# Patient Record
Sex: FEMALE | Race: WHITE | NOT HISPANIC OR LATINO | Employment: FULL TIME | URBAN - METROPOLITAN AREA
[De-identification: names, ages, dates, MRNs, and addresses within clinical notes are randomized per-mention and may not be internally consistent; named-entity substitution may affect disease eponyms.]

---

## 2023-01-05 ENCOUNTER — LAB VISIT (OUTPATIENT)
Dept: LAB | Facility: HOSPITAL | Age: 38
End: 2023-01-05
Attending: ALLERGY & IMMUNOLOGY
Payer: COMMERCIAL

## 2023-01-05 ENCOUNTER — OFFICE VISIT (OUTPATIENT)
Dept: ALLERGY | Facility: CLINIC | Age: 38
End: 2023-01-05
Payer: COMMERCIAL

## 2023-01-05 VITALS
BODY MASS INDEX: 40.48 KG/M2 | HEIGHT: 67 IN | HEART RATE: 85 BPM | TEMPERATURE: 98 F | WEIGHT: 257.94 LBS | DIASTOLIC BLOOD PRESSURE: 80 MMHG | SYSTOLIC BLOOD PRESSURE: 137 MMHG

## 2023-01-05 DIAGNOSIS — J31.0 RHINITIS, UNSPECIFIED TYPE: ICD-10-CM

## 2023-01-05 DIAGNOSIS — B99.9 RECURRENT INFECTIONS: ICD-10-CM

## 2023-01-05 DIAGNOSIS — B99.9 RECURRENT INFECTIONS: Primary | ICD-10-CM

## 2023-01-05 LAB — IGE SERPL-ACNC: <35 IU/ML (ref 0–100)

## 2023-01-05 PROCEDURE — 3079F DIAST BP 80-89 MM HG: CPT | Mod: CPTII,S$GLB,, | Performed by: ALLERGY & IMMUNOLOGY

## 2023-01-05 PROCEDURE — 3075F PR MOST RECENT SYSTOLIC BLOOD PRESS GE 130-139MM HG: ICD-10-PCS | Mod: CPTII,S$GLB,, | Performed by: ALLERGY & IMMUNOLOGY

## 2023-01-05 PROCEDURE — 99999 PR PBB SHADOW E&M-NEW PATIENT-LVL IV: CPT | Mod: PBBFAC,,, | Performed by: ALLERGY & IMMUNOLOGY

## 2023-01-05 PROCEDURE — 86359 T CELLS TOTAL COUNT: CPT | Performed by: ALLERGY & IMMUNOLOGY

## 2023-01-05 PROCEDURE — 86317 IMMUNOASSAY INFECTIOUS AGENT: CPT | Mod: 59 | Performed by: ALLERGY & IMMUNOLOGY

## 2023-01-05 PROCEDURE — 3075F SYST BP GE 130 - 139MM HG: CPT | Mod: CPTII,S$GLB,, | Performed by: ALLERGY & IMMUNOLOGY

## 2023-01-05 PROCEDURE — 99204 PR OFFICE/OUTPT VISIT, NEW, LEVL IV, 45-59 MIN: ICD-10-PCS | Mod: S$GLB,,, | Performed by: ALLERGY & IMMUNOLOGY

## 2023-01-05 PROCEDURE — 86357 NK CELLS TOTAL COUNT: CPT | Performed by: ALLERGY & IMMUNOLOGY

## 2023-01-05 PROCEDURE — 86003 ALLG SPEC IGE CRUDE XTRC EA: CPT | Mod: 59 | Performed by: ALLERGY & IMMUNOLOGY

## 2023-01-05 PROCEDURE — 86684 HEMOPHILUS INFLUENZA ANTIBDY: CPT | Performed by: ALLERGY & IMMUNOLOGY

## 2023-01-05 PROCEDURE — 85025 COMPLETE CBC W/AUTO DIFF WBC: CPT | Performed by: ALLERGY & IMMUNOLOGY

## 2023-01-05 PROCEDURE — 82787 IGG 1 2 3 OR 4 EACH: CPT | Mod: 59 | Performed by: ALLERGY & IMMUNOLOGY

## 2023-01-05 PROCEDURE — 82784 ASSAY IGA/IGD/IGG/IGM EACH: CPT | Mod: 59 | Performed by: ALLERGY & IMMUNOLOGY

## 2023-01-05 PROCEDURE — 3008F PR BODY MASS INDEX (BMI) DOCUMENTED: ICD-10-PCS | Mod: CPTII,S$GLB,, | Performed by: ALLERGY & IMMUNOLOGY

## 2023-01-05 PROCEDURE — 1159F MED LIST DOCD IN RCRD: CPT | Mod: CPTII,S$GLB,, | Performed by: ALLERGY & IMMUNOLOGY

## 2023-01-05 PROCEDURE — 36415 COLL VENOUS BLD VENIPUNCTURE: CPT | Performed by: ALLERGY & IMMUNOLOGY

## 2023-01-05 PROCEDURE — 3079F PR MOST RECENT DIASTOLIC BLOOD PRESSURE 80-89 MM HG: ICD-10-PCS | Mod: CPTII,S$GLB,, | Performed by: ALLERGY & IMMUNOLOGY

## 2023-01-05 PROCEDURE — 82785 ASSAY OF IGE: CPT | Performed by: ALLERGY & IMMUNOLOGY

## 2023-01-05 PROCEDURE — 3008F BODY MASS INDEX DOCD: CPT | Mod: CPTII,S$GLB,, | Performed by: ALLERGY & IMMUNOLOGY

## 2023-01-05 PROCEDURE — 86355 B CELLS TOTAL COUNT: CPT | Performed by: ALLERGY & IMMUNOLOGY

## 2023-01-05 PROCEDURE — 82784 ASSAY IGA/IGD/IGG/IGM EACH: CPT | Performed by: ALLERGY & IMMUNOLOGY

## 2023-01-05 PROCEDURE — 1159F PR MEDICATION LIST DOCUMENTED IN MEDICAL RECORD: ICD-10-PCS | Mod: CPTII,S$GLB,, | Performed by: ALLERGY & IMMUNOLOGY

## 2023-01-05 PROCEDURE — 99204 OFFICE O/P NEW MOD 45 MIN: CPT | Mod: S$GLB,,, | Performed by: ALLERGY & IMMUNOLOGY

## 2023-01-05 PROCEDURE — 86003 ALLG SPEC IGE CRUDE XTRC EA: CPT | Performed by: ALLERGY & IMMUNOLOGY

## 2023-01-05 PROCEDURE — 86360 T CELL ABSOLUTE COUNT/RATIO: CPT | Performed by: ALLERGY & IMMUNOLOGY

## 2023-01-05 PROCEDURE — 99999 PR PBB SHADOW E&M-NEW PATIENT-LVL IV: ICD-10-PCS | Mod: PBBFAC,,, | Performed by: ALLERGY & IMMUNOLOGY

## 2023-01-05 RX ORDER — VALACYCLOVIR HYDROCHLORIDE 500 MG/1
500 TABLET, FILM COATED ORAL
COMMUNITY
Start: 2022-12-30

## 2023-01-05 RX ORDER — ALBUTEROL SULFATE 90 UG/1
AEROSOL, METERED RESPIRATORY (INHALATION)
COMMUNITY

## 2023-01-05 RX ORDER — DICYCLOMINE HYDROCHLORIDE 20 MG/1
TABLET ORAL
COMMUNITY
Start: 2023-01-04

## 2023-01-05 RX ORDER — THYROID, PORCINE 90 MG/1
90 TABLET ORAL
COMMUNITY
Start: 2022-11-23

## 2023-01-05 RX ORDER — BUDESONIDE AND FORMOTEROL FUMARATE DIHYDRATE 80; 4.5 UG/1; UG/1
AEROSOL RESPIRATORY (INHALATION)
COMMUNITY

## 2023-01-05 RX ORDER — BUDESONIDE 1 MG/2ML
INHALANT ORAL
COMMUNITY
Start: 2022-09-28

## 2023-01-05 RX ORDER — DUPILUMAB 300 MG/2ML
INJECTION, SOLUTION SUBCUTANEOUS
COMMUNITY
Start: 2022-12-23

## 2023-01-05 RX ORDER — PANTOPRAZOLE SODIUM 40 MG/1
40 TABLET, DELAYED RELEASE ORAL
COMMUNITY
Start: 2023-01-04

## 2023-01-05 RX ORDER — ROSUVASTATIN CALCIUM 10 MG/1
10 TABLET, COATED ORAL
COMMUNITY
Start: 2022-12-30

## 2023-01-05 RX ORDER — VENLAFAXINE HYDROCHLORIDE 150 MG/1
150 CAPSULE, EXTENDED RELEASE ORAL
COMMUNITY
Start: 2022-12-10

## 2023-01-05 RX ORDER — ONDANSETRON 4 MG/1
4 TABLET, FILM COATED ORAL EVERY 8 HOURS
COMMUNITY
Start: 2022-09-17

## 2023-01-05 RX ORDER — IPRATROPIUM BROMIDE 21 UG/1
2 SPRAY, METERED NASAL 3 TIMES DAILY
Qty: 20 ML | Refills: 5 | Status: SHIPPED | OUTPATIENT
Start: 2023-01-05

## 2023-01-05 RX ORDER — CYCLOBENZAPRINE HCL 5 MG
5 TABLET ORAL
COMMUNITY
Start: 2022-12-01

## 2023-01-05 RX ORDER — PREGABALIN 75 MG/1
75 CAPSULE ORAL 2 TIMES DAILY
COMMUNITY
Start: 2022-12-01

## 2023-01-05 RX ORDER — SEMAGLUTIDE 1.34 MG/ML
0.5 INJECTION, SOLUTION SUBCUTANEOUS
COMMUNITY
Start: 2022-12-28 | End: 2023-01-25

## 2023-01-05 RX ORDER — ERGOCALCIFEROL 1.25 MG/1
CAPSULE ORAL
COMMUNITY

## 2023-01-05 RX ORDER — RIZATRIPTAN BENZOATE 10 MG/1
10 TABLET ORAL
COMMUNITY
Start: 2022-12-12

## 2023-01-05 RX ORDER — PROPRANOLOL HYDROCHLORIDE 80 MG/1
80 CAPSULE, EXTENDED RELEASE ORAL
COMMUNITY
Start: 2022-12-19

## 2023-01-05 RX ORDER — LEVOTHYROXINE SODIUM 88 UG/1
88 TABLET ORAL
COMMUNITY
Start: 2022-12-09

## 2023-01-05 RX ORDER — TRAMADOL HYDROCHLORIDE 50 MG/1
50 TABLET ORAL EVERY 6 HOURS PRN
COMMUNITY
Start: 2022-09-01

## 2023-01-05 RX ORDER — PLECANATIDE 3 MG/1
1 TABLET ORAL
COMMUNITY
Start: 2023-01-04

## 2023-01-05 RX ORDER — PROMETHAZINE HYDROCHLORIDE 25 MG/1
25 TABLET ORAL EVERY 6 HOURS PRN
COMMUNITY

## 2023-01-05 NOTE — PROGRESS NOTES
"Subjective:       Patient ID: Lidia Cano is a 37 y.o. female.    Chief Complaint:  Other (History of abnormal immune labs)      HPI:  37 year old female referred by Rheumatologist due to abnormal labs.  She was seen by Rheumatologist for positive FARA.    Hives with sun exposure. Alleviates symptoms with sunscreen.    Dermatologist -skin patch testing- significant for several things. Started Dupixent.    Hand foot and mouth -3 times over a 6 month period.  "I am always sick and I can not get over it without steroids and antibiotics."  Cough for 2-3 months at a time after a URI.    Antibiotics- "more than a dozen times over the last year".    No hospitalizations    Sinoplasty and turbinectomy    "We have tried to see if some of the asthma medications would help me from being sick".  Spirometry with an Allergist outside of [facility] "reactive sinusitis".  Allergy test was negative per her report.    Not taking allergy medications      Past Medical History:   See above  Hypertension  Hypothryoid        Family History:  Atopy      Current Outpatient Medications on File Prior to Visit   Medication Sig Dispense Refill    albuterol (PROVENTIL/VENTOLIN HFA) 90 mcg/actuation inhaler albuterol sulfate HFA 90 mcg/actuation aerosol inhaler      ARMOUR THYROID 90 mg Tab Take 90 mg by mouth.      budesonide 1 mg/2 mL NbSp SMARTSI Vial(s) Both Nares Twice Daily      budesonide-formoterol 80-4.5 mcg (SYMBICORT) 80-4.5 mcg/actuation HFAA Symbicort 80 mcg-4.5 mcg/actuation HFA aerosol inhaler      cyclobenzaprine (FLEXERIL) 5 MG tablet Take 5 mg by mouth.      dicyclomine (BENTYL) 20 mg tablet Take by mouth.      DUPIXENT  mg/2 mL PnIj Inject into the skin.      ergocalciferol (ERGOCALCIFEROL) 50,000 unit Cap ergocalciferol (vitamin D2) 1,250 mcg (50,000 unit) capsule      levothyroxine (SYNTHROID) 88 MCG tablet Take 88 mcg by mouth.      ondansetron (ZOFRAN) 4 MG tablet Take 4 mg by mouth every 8 (eight) hours.      " pantoprazole (PROTONIX) 40 MG tablet Take 40 mg by mouth.      pregabalin (LYRICA) 75 MG capsule Take 75 mg by mouth 2 (two) times daily.      promethazine (PHENERGAN) 25 MG tablet Take 25 mg by mouth every 6 (six) hours as needed.      propranoloL (INDERAL LA) 80 MG 24 hr capsule Take 80 mg by mouth.      rizatriptan (MAXALT) 10 MG tablet Take 10 mg by mouth as needed.      rosuvastatin (CRESTOR) 10 MG tablet Take 10 mg by mouth.      semaglutide (OZEMPIC) 0.25 mg or 0.5 mg(2 mg/1.5 mL) pen injector Inject 0.5 mg into the skin.      traMADoL (ULTRAM) 50 mg tablet Take 50 mg by mouth every 6 (six) hours as needed.      TRULANCE 3 mg Tab Take 1 tablet by mouth.      valACYclovir (VALTREX) 500 MG tablet Take 500 mg by mouth.      venlafaxine (EFFEXOR-XR) 150 MG Cp24 Take 150 mg by mouth.       No current facility-administered medications on file prior to visit.        Review of patient's allergies indicates:  No Known Allergies      Environmental History: Pets in the home: dogs (1).  Tobacco Smoke in Home: no  Review of Systems   Constitutional:  Negative for chills and fever.   HENT:  Negative for congestion and rhinorrhea.    Eyes:  Negative for discharge and itching.   Respiratory:  Negative for cough, shortness of breath and wheezing.    Cardiovascular:  Negative for chest pain and leg swelling.   Gastrointestinal:  Negative for nausea and vomiting.   Skin:  Positive for rash. Negative for wound.   Allergic/Immunologic: Positive for environmental allergies. Negative for food allergies.   Neurological:  Negative for facial asymmetry and speech difficulty.   Hematological:  Negative for adenopathy. Does not bruise/bleed easily.   Psychiatric/Behavioral:  Negative for behavioral problems and suicidal ideas.       Objective:    Physical Exam  Vitals reviewed.   Constitutional:       General: She is not in acute distress.     Appearance: Normal appearance. She is well-developed. She is obese. She is not ill-appearing,  toxic-appearing or diaphoretic.   HENT:      Head: Normocephalic and atraumatic.      Right Ear: Tympanic membrane, ear canal and external ear normal. There is no impacted cerumen.      Left Ear: Tympanic membrane, ear canal and external ear normal. There is no impacted cerumen.      Nose: Nose normal. No congestion or rhinorrhea.      Mouth/Throat:      Pharynx: No oropharyngeal exudate or posterior oropharyngeal erythema.   Eyes:      General: No scleral icterus.        Right eye: No discharge.         Left eye: No discharge.      Pupils: Pupils are equal, round, and reactive to light.   Neck:      Thyroid: No thyromegaly.   Cardiovascular:      Rate and Rhythm: Normal rate and regular rhythm.      Heart sounds: Normal heart sounds. No murmur heard.    No friction rub. No gallop.   Pulmonary:      Effort: Pulmonary effort is normal. No respiratory distress.      Breath sounds: Normal breath sounds. No stridor. No wheezing, rhonchi or rales.   Chest:      Chest wall: No tenderness.   Abdominal:      General: Bowel sounds are normal. There is no distension.      Palpations: Abdomen is soft. There is no mass.      Tenderness: There is no abdominal tenderness. There is no guarding or rebound.      Hernia: No hernia is present.   Musculoskeletal:         General: No swelling, tenderness, deformity or signs of injury. Normal range of motion.      Cervical back: Normal range of motion and neck supple. No rigidity or tenderness. No muscular tenderness.      Right lower leg: No edema.      Left lower leg: No edema.   Lymphadenopathy:      Cervical: No cervical adenopathy.   Skin:     General: Skin is warm.      Coloration: Skin is not jaundiced or pale.      Findings: No bruising, erythema or rash.   Neurological:      Mental Status: She is alert and oriented to person, place, and time.      Gait: Gait normal.   Psychiatric:         Mood and Affect: Mood normal.         Behavior: Behavior normal.         Thought Content:  Thought content normal.         Judgment: Judgment normal.          Assessment:       1. Recurrent infections    2. Rhinitis, unspecified type         Plan:       Recurrent infections  -     CBC Auto Differential; Future; Expected date: 01/05/2023  -     IgG 1, 2, 3, and 4; Future; Expected date: 01/05/2023  -     IgG; Future; Expected date: 01/05/2023  -     IgA; Future; Expected date: 01/05/2023  -     IgM; Future; Expected date: 01/05/2023  -     Haemophilius influenzae B Ab IgG; Future; Expected date: 01/05/2023  -     S.pneumoniae IgG Serotypes; Future; Expected date: 01/05/2023  -     Tetanus Toxoid, IgG; Future; Expected date: 01/05/2023  -     Lymphocyte Profile II; Future; Expected date: 01/05/2023  -     DIPHTHERIA / TETANUS ANTIBODY PANEL; Future; Expected date: 01/05/2023    Rhinitis, unspecified type  -     IgE; Future; Expected date: 01/05/2023  -     [place] grass IgE; Future; Expected date: 01/05/2023  -     Aspergillus fumagatus IgE; Future; Expected date: 01/05/2023  -     Chaetomium globosum IgE; Future; Expected date: 01/05/2023  -     Cockroach, American IgE; Future; Expected date: 01/05/2023  -     Cladosporium IgE; Future; Expected date: 01/05/2023  -     Curvularia lunata IgE; Future; Expected date: 01/05/2023  -     D. farinae IgE; Future; Expected date: 01/05/2023  -     D. pteronyssinus IgE; Future; Expected date: 01/05/2023  -     Dog dander IgE; Future; Expected date: 01/05/2023  -     Plantain, English IgE; Future; Expected date: 01/05/2023  -     Eucalyptus IgE; Future; Expected date: 01/05/2023  -     Garnica elder, rough IgE; Future; Expected date: 01/05/2023  -     Mugwort IgE; Future; Expected date: 01/05/2023  -     Nettle IgE; Future; Expected date: 01/05/2023  -     Orchard grass IgE; Future; Expected date: 01/05/2023  -     [place], western white IgE; Future; Expected date: 01/05/2023  -     [place], common IgE; Future; Expected date: 01/05/2023  -     Ragweed, short, common IgE;  Future; Expected date: 01/05/2023  -     Red top grass IgE; Future; Expected date: 01/05/2023  -     Rye grass, cultivated IgE; Future; Expected date: 01/05/2023  -     Thistle, Russian IgE; Future; Expected date: 01/05/2023  -     Stemphyllium IgE; Future; Expected date: 01/05/2023  -     Tayo IgE; Future; Expected date: 01/05/2023  -     Walter grass IgE; Future; Expected date: 01/05/2023  -     Allergen, Pecan Tree IgE; Future; Expected date: 01/05/2023  -     [place], black IgE; Future; Expected date: 01/05/2023  -     [place], bald IgE; Future; Expected date: 01/05/2023  -     Oak, white IgE; Future; Expected date: 01/05/2023  -     Allergen, [place]; Future; Expected date: 01/05/2023  -     Cat epithelium IgE; Future; Expected date: 01/05/2023  -     Allergen, Hackberry Celtis; Future; Expected date: 01/05/2023  -     Allergen, Elm Cedar; Future; Expected date: 01/05/2023  -     Allergen-[place]; Future; Expected date: 01/05/2023  -     Allergen-Alternaria Alternata; Future; Expected date: 01/05/2023  -     Allergen-[place]; Future; Expected date: 01/05/2023  -     RAST Allergen for [place]; Future; Expected date: 01/05/2023  -     RAST Allergen Maple ([place]); Future; Expected date: 01/05/2023  -     Allergen, White [place]; Future; Expected date: 01/05/2023  -     Allergen, Meadow [place] ([place]); Future; Expected date: 01/05/2023  -     Allergen-[place]; Future; Expected date: 01/05/2023  -     RAST Allergen [place]; Future; Expected date: 01/05/2023  -     RAST Allergen, Sheep Vaiva Vo([place]); Future; Expected date: 01/05/2023     Atrovent nasal spray.  Checking labs  At [facility], thus unable to perform spirometry.    RTC 4-6 weeks or sooner, if needed     NAKUL WIGGINS spent a total of 47 minutes  on the day of the visit.  This includes face to face time and non-face to face time preparing to see the patient (eg, review of tests), obtaining and/or reviewing  separately obtained history, documenting clinical information in the electronic or other health record, independently interpreting results and communicating results to the patient/family/caregiver, or care coordinator.

## 2023-01-06 LAB
BASOPHILS # BLD AUTO: 0.05 K/UL (ref 0–0.2)
BASOPHILS NFR BLD: 0.6 % (ref 0–1.9)
CD3+CD4+ CELLS # BLD: 1463 CELLS/UL (ref 300–1400)
CD3+CD4+ CELLS NFR BLD: 53.2 % (ref 28–57)
DIFFERENTIAL METHOD: NORMAL
EOSINOPHIL # BLD AUTO: 0.2 K/UL (ref 0–0.5)
EOSINOPHIL NFR BLD: 1.7 % (ref 0–8)
ERYTHROCYTE [DISTWIDTH] IN BLOOD BY AUTOMATED COUNT: 12.7 % (ref 11.5–14.5)
HCT VFR BLD AUTO: 40.2 % (ref 37–48.5)
HGB BLD-MCNC: 13.3 G/DL (ref 12–16)
IGA SERPL-MCNC: 222 MG/DL (ref 40–350)
IGG SERPL-MCNC: 942 MG/DL (ref 650–1600)
IGM SERPL-MCNC: 189 MG/DL (ref 50–300)
IMM GRANULOCYTES # BLD AUTO: 0.03 K/UL (ref 0–0.04)
IMM GRANULOCYTES NFR BLD AUTO: 0.3 % (ref 0–0.5)
LYMPHOCYTES # BLD AUTO: 2.8 K/UL (ref 1–4.8)
LYMPHOCYTES NFR BLD: 30.6 % (ref 18–48)
LYMPHOCYTES NFR CSF MANUAL: 14.9 % (ref 6–19)
LYMPHOCYTES NFR CSF MANUAL: 2.39 % (ref 0.9–3.6)
LYMPHOCYTES NFR CSF MANUAL: 2084 CELLS/UL (ref 700–2100)
LYMPHOCYTES NFR CSF MANUAL: 22.3 % (ref 10–39)
LYMPHOCYTES NFR CSF MANUAL: 223 CELLS/UL (ref 90–600)
LYMPHOCYTES NFR CSF MANUAL: 417 CELLS/UL (ref 100–500)
LYMPHOCYTES NFR CSF MANUAL: 612 CELLS/UL (ref 200–900)
LYMPHOCYTES NFR CSF MANUAL: 75.8 % (ref 55–83)
LYMPHOCYTES NFR CSF MANUAL: 8 % (ref 7–31)
MCH RBC QN AUTO: 29.7 PG (ref 27–31)
MCHC RBC AUTO-ENTMCNC: 33.1 G/DL (ref 32–36)
MCV RBC AUTO: 90 FL (ref 82–98)
MONOCYTES # BLD AUTO: 0.6 K/UL (ref 0.3–1)
MONOCYTES NFR BLD: 7.1 % (ref 4–15)
NEUTROPHILS # BLD AUTO: 5.4 K/UL (ref 1.8–7.7)
NEUTROPHILS NFR BLD: 59.7 % (ref 38–73)
NRBC BLD-RTO: 0 /100 WBC
PLATELET # BLD AUTO: 278 K/UL (ref 150–450)
PMV BLD AUTO: 11 FL (ref 9.2–12.9)
RBC # BLD AUTO: 4.48 M/UL (ref 4–5.4)
WBC # BLD AUTO: 9.03 K/UL (ref 3.9–12.7)

## 2023-01-09 LAB
AMER SYCAMORE IGE QN: <0.35 KU/L
DIPHTHERIA IGG VALUE: 0.39 IU/ML
DIPHTHERIA TOXOID IGG ANTIBODY: POSITIVE
HAEM INFLU B IGG SER IA-MCNC: 0.17 MG/L
TETANUS TOXOID IGG AB: POSITIVE
TETANUS TOXOID IGG AB: POSITIVE
TETANUS TOXOID IGG VALUE: 1.67 IU/ML
TETANUS TOXOID IGG VALUE: 1.67 IU/ML

## 2023-01-10 LAB
A ALTERNATA IGE QN: <0.1 KU/L
A FUMIGATUS IGE QN: <0.1 KU/L
ALLERGEN BOXELDER MAPLE TREE IGE: <0.1 KU/L
ALLERGEN CHAETOMIUM GLOBOSUM IGE: <0.1 KU/L
ALLERGEN MAPLE (BOX ELDER) CLASS: NORMAL
ALLERGEN MULBERRY CLASS: NORMAL
ALLERGEN MULBERRY TREE IGE: <0.1 KU/L
ALLERGEN WHITE ASH TREE IGE: <0.1 KU/L
ALLERGEN WHITE PINE TREE IGE: <0.1 KU/L
BAHIA GRASS IGE QN: <0.1 KU/L
BALD CYPRESS IGE QN: <0.1 KU/L
C HERBARUM IGE QN: <0.1 KU/L
C LUNATA IGE QN: <0.1 KU/L
CAT DANDER IGE QN: <0.1 KU/L
CHAETOMIUM GLOB. CLASS: NORMAL
COCKLEBUR IGE QN: <0.1 KU/L
COCKSFOOT IGE QN: <0.1 KU/L
COMMON RAGWEED IGE QN: <0.1 KU/L
COTTONWOOD IGE QN: <0.1 KU/L
D FARINAE IGE QN: <0.1 KU/L
D PTERONYSS IGE QN: <0.1 KU/L
DEPRECATED A ALTERNATA IGE RAST QL: NORMAL
DEPRECATED A FUMIGATUS IGE RAST QL: NORMAL
DEPRECATED BAHIA GRASS IGE RAST QL: NORMAL
DEPRECATED BALD CYPRESS IGE RAST QL: NORMAL
DEPRECATED C HERBARUM IGE RAST QL: NORMAL
DEPRECATED C LUNATA IGE RAST QL: NORMAL
DEPRECATED CAT DANDER IGE RAST QL: NORMAL
DEPRECATED COCKLEBUR IGE RAST QL: NORMAL
DEPRECATED COCKSFOOT IGE RAST QL: NORMAL
DEPRECATED COMMON RAGWEED IGE RAST QL: NORMAL
DEPRECATED COTTONWOOD IGE RAST QL: NORMAL
DEPRECATED D FARINAE IGE RAST QL: NORMAL
DEPRECATED D PTERONYSS IGE RAST QL: NORMAL
DEPRECATED DOG DANDER IGE RAST QL: NORMAL
DEPRECATED ELDER IGE RAST QL: NORMAL
DEPRECATED ENGL PLANTAIN IGE RAST QL: NORMAL
DEPRECATED GUM-TREE IGE RAST QL: NORMAL
DEPRECATED HACKBERRY TREE IGE RAST QL: NORMAL
DEPRECATED JOHNSON GRASS IGE RAST QL: NORMAL
DEPRECATED KENT BLUE GRASS IGE RAST QL: NORMAL
DEPRECATED LONDON PLANE IGE RAST QL: NORMAL
DEPRECATED MUGWORT IGE RAST QL: NORMAL
DEPRECATED NETTLE IGE RAST QL: NORMAL
DEPRECATED PECAN/HICK TREE IGE RAST QL: NORMAL
DEPRECATED PER RYE GRASS IGE RAST QL: NORMAL
DEPRECATED PRIVET IGE RAST QL: NORMAL
DEPRECATED RED TOP GRASS IGE RAST QL: NORMAL
DEPRECATED ROACH IGE RAST QL: NORMAL
DEPRECATED SALTWORT IGE RAST QL: NORMAL
DEPRECATED SHEEP SORREL IGE RAST QL: NORMAL
DEPRECATED SILVER BIRCH IGE RAST QL: NORMAL
DEPRECATED TIMOTHY IGE RAST QL: NORMAL
DEPRECATED WHITE OAK IGE RAST QL: NORMAL
DEPRECATED WILLOW IGE RAST QL: NORMAL
DOG DANDER IGE QN: <0.1 KU/L
ELDER IGE QN: <0.1 KU/L
ELM CEDAR CLASS: NORMAL
ELM CEDAR, IGE: <0.1 KU/L
ENGL PLANTAIN IGE QN: <0.1 KU/L
GUM-TREE IGE QN: <0.1 KU/L
HACKBERRY TREE IGE QN: <0.1 KU/L
IGG1 SER-MCNC: 471 MG/DL (ref 382–929)
IGG2 SER-MCNC: 395 MG/DL (ref 242–700)
IGG3 SER-MCNC: 54 MG/DL (ref 22–176)
IGG4 SER-MCNC: 24 MG/DL (ref 4–86)
JOHNSON GRASS IGE QN: <0.1 KU/L
KENT BLUE GRASS IGE QN: <0.1 KU/L
LONDON PLANE IGE QN: <0.1 KU/L
MUGWORT IGE QN: <0.1 KU/L
NETTLE IGE QN: <0.1 KU/L
PECAN/HICK TREE IGE QN: <0.1 KU/L
PER RYE GRASS IGE QN: <0.1 KU/L
PRIVET IGE QN: <0.1 KU/L
RED TOP GRASS IGE QN: <0.1 KU/L
ROACH IGE QN: <0.1 KU/L
SALTWORT IGE QN: <0.1 KU/L
SHEEP SORREL IGE QN: <0.1 KU/L
SILVER BIRCH IGE QN: <0.1 KU/L
STEMPHYLIUM HERBARUM CLASS: NORMAL
STEMPHYLLIUM, IGE: <0.1 KU/L
TIMOTHY IGE QN: <0.1 KU/L
WHITE ASH CLASS: NORMAL
WHITE OAK IGE QN: <0.1 KU/L
WHITE PINE CLASS: NORMAL
WILLOW IGE QN: <0.1 KU/L

## 2023-01-12 ENCOUNTER — TELEPHONE (OUTPATIENT)
Dept: ALLERGY | Facility: CLINIC | Age: 38
End: 2023-01-12
Payer: COMMERCIAL

## 2023-01-12 LAB
DEPRECATED S PNEUM12 IGG SER-MCNC: <0.3 UG/ML
DEPRECATED S PNEUM23 IGG SER-MCNC: <0.3 UG/ML
DEPRECATED S PNEUM4 IGG SER-MCNC: <0.3 UG/ML
DEPRECATED S PNEUM8 IGG SER-MCNC: <0.3 UG/ML
DEPRECATED S PNEUM9 IGG SER-MCNC: <0.3 UG/ML
S PN DA SERO 19F IGG SER-MCNC: 2.9 UG/ML
S PNEUM DA 1 IGG SER-MCNC: <0.3 UG/ML
S PNEUM DA 14 IGG SER-MCNC: 1.9
S PNEUM DA 18C IGG SER-MCNC: 0.8
S PNEUM DA 3 IGG SER-MCNC: <0.3 UG/ML
S PNEUM DA 5 IGG SER-MCNC: 1.6 UG/ML
S PNEUM DA 6B IGG SER-MCNC: 0.9 UG/ML
S PNEUM DA 7F IGG SER-MCNC: 2.5 UG/ML
S PNEUM DA 9V IGG SER-MCNC: <0.3 UG/ML

## 2023-01-12 NOTE — TELEPHONE ENCOUNTER
Per Dr. Valentin - pt notified that pneumococcal titers were sub optimal and that Prevnar 20 vaccine is recommenced. Pt verbalized understanding and stated she will contact her PCP for the vaccine.